# Patient Record
Sex: MALE | Race: WHITE | URBAN - METROPOLITAN AREA
[De-identification: names, ages, dates, MRNs, and addresses within clinical notes are randomized per-mention and may not be internally consistent; named-entity substitution may affect disease eponyms.]

---

## 2017-01-06 ENCOUNTER — PRE VISIT (OUTPATIENT)
Dept: UROLOGY | Facility: CLINIC | Age: 42
End: 2017-01-06

## 2017-01-06 DIAGNOSIS — R35.1 NOCTURIA: Primary | ICD-10-CM

## 2017-01-06 NOTE — TELEPHONE ENCOUNTER
Christopher Martin is scheduled for Urodynamic testing.   Pre visit phone call made on 1/6/2017.    Voicemail reminder left for patient: No  Appointment date and time confirmed with patient: Yes.  Patient has signs/symptoms of UTI in the last 72 hours: No  Patient instructed to complete UAUC prior to test to rule out infection:No   Patient has neurogenic bladder and will continue with normal bowel program as well as complete fleets enema the night prior to testing: No  If patient takes anticholinergic medication: instructed to discontinue 3-5 days prior to test if med is not helpful (OR) If it is effective, patient instructed to continue with medications as prescribed. No, patient does not take anticholinergic.    Patient reminded to:  -Bring voiding diary to appointment: Not given  -Arrive with a comfortably full bladder: No  -Eat and and drink normally before the test: Yes.  -Not use lotion or powder on skin below the waist before testing: Yes.  -Encouraged to arrive on time for appointment due to complexity of test being performed: Yes.

## 2017-01-18 ENCOUNTER — OFFICE VISIT (OUTPATIENT)
Dept: UROLOGY | Facility: CLINIC | Age: 42
End: 2017-01-18

## 2017-01-18 DIAGNOSIS — R33.9 INCOMPLETE BLADDER EMPTYING: ICD-10-CM

## 2017-01-18 DIAGNOSIS — N31.9 NEUROGENIC BLADDER: Primary | ICD-10-CM

## 2017-01-18 DIAGNOSIS — N39.46 MIXED INCONTINENCE: Primary | ICD-10-CM

## 2017-01-18 LAB
APPEARANCE UR: CLEAR
BILIRUB UR QL: NORMAL
COLOR UR: YELLOW
GLUCOSE URINE: NORMAL MG/DL
HGB UR QL: NORMAL
KETONES UR QL: NORMAL MG/DL
LEUKOCYTE ESTERASE URINE: NORMAL
NITRITE UR QL STRIP: NORMAL
PH UR STRIP: 5 PH (ref 5–7)
PROTEIN ALBUMIN URINE: NORMAL MG/DL
SOURCE: NORMAL
SP GR UR STRIP: 1.01 (ref 1–1.03)
UROBILINOGEN UR QL STRIP: 0.2 EU/DL (ref 0.2–1)

## 2017-01-18 NOTE — PROGRESS NOTES
INDICATIONS FOR PROCEDURE:  Mr. Christopher Martin is a pleasant 41 year old male with nocturia enuresis,  decreased bladder emptying (CIC 8x/day), and mixed incontinence.  Patient continues to have residual lower extremity weakness/decreased sensation, and some difficulty with fecal incontinece/and bowel elimination. Patient denies dysuria (attempts to void when bladder is full), hematuria, recent UTI or kidney stones.    Pertinent medical history:  Lumbar burst fractures from  fall resulting in cauda equina syndrome-T11-L3 posterior fusion (2015)     PROCEDURE:    1.Complex filling cystourethrogram with measurement of bladder and rectal pressures.  2.Complex voiding cystourethrogram with measurement of bladder and rectal pressures.  3.Electromyography during urodynamics.  4.Sterile urethral catheterization for measurement of postvoid residual urine volume.    VOIDING DIARY:  Did not complete-patient does CIC 8x/day-uses 6 depends in 24 hours.     DESCRIPTION OF PROCEDURE:  Risks, benefits, and alternatives were discussed with the patient and they wished to proceed. Urodynamics is planned to better assess the primary etiology for Mr. Martin's urologic dysfunction.  The patient does not take anticholinergic medication.  After informed consent was obtained, the patient was taken to the procedure room where uroflowmetry was performed. Findings below. Next a triple-lumen urodynamics catheter was inserted in the bladder. A rectal manometry catheter was placed in the rectum. EMG pads were placed on either side of the anal verge. The bladder was filled with sterile saline at 25 cc/minute and serial pressures were recorded.     Pre-test urine dipstick was  negative for nitrites or leukocytes.  UROFLOW: patient does not naturally void-CIC per sensation 8x/day  Post void residual per catheter: 225 ml     DURING THE FILLING PHASE:  At the following volumes the patient experienced:  First sensation: 87 ml  First Desire: 133  ml  Strong Desire/capacity: 331 ml-followed by uninhibited detrusor contraction with large incontinence    Uninhibited detrusor contractions: single event with multiple contractions at strong desire (331 ml)   Compliance: Good Pdet = 17 cm H2O at capacity of 331 mL,  associated incontinence   Uninhibited detruser contraction: yes.  At a capacity of 331 ml patient does not feel urgency.    Stress incontinence: yes at leak at abd pressure of 109 cmH2O and volume of 200 ml(due to previous incontinence with strong desire)- no ISD.  EMG: concordant    DURING THE VOIDING PHASE: unable to void with catheters in place  Peak Detrusor Contraction with attempt to void: 11 cmH2O  Voided volume: 0 ml (with catheters)  325 ml without catheters with Crede voiding   Post void Residual per ultrasound: 20 ml  DSD: not present with attempt to void    FLUOROSCOPIC IMAGING OF THE BLADDER DURING UDS demonstrated a slightly irregular walled bladder- Grade 0 (absent) trabeculation  no vesicoureteral reflux was observed.  The bladder neck was closed during filling and  During attempt to void-open with incontinence.  200 ml of contrast instilled via urethra   After voiding to completion, all catheters were removed and the patient was brought back into the consultation room to discuss his study results.      A single cipro antibiotic was provided for UTI prophylaxis per protocol, following completion of study.     A/P:  Mr. Christopher Martin is a very pleasant 41 year old male who demonstrated:  Good compliance    Small/normal bladder capacity   Evidence of OAB/uninhibited detrusor contractions at strong desire  Large incontinence caused by uninhibited contraction at strong desire (331 ml) small incontinence with stress test (valsalva) at 200 ml.    No detrusor sphincter dyssynergia  No  signs of outlet obstruction-able to Crede void to PVR of 20 ml.        Follow up with Dr. Sneed 1/30/2017 to discuss results of this study and treatment option  for incomplete bladder emptying and incontinence which may include other catheterization options, medication management, PTNS or surgical intervention.  Until then patient will continue to CIC per sensation-suggested more often at night time (set alarm) to assist with nocturnal enuresis.     Thank you for allowing me to participate in the care of  Mr. Christopher Martin.  Aide Domingo PA-C, PT  January 18, 2017

## 2017-01-18 NOTE — Clinical Note
1/18/2017       RE: Christopher Martin  [address]  [facility] [address]     Dear Colleague,    Thank you for referring your patient, Christopher Martin, to the [facility] UROLOGY AND INST FOR PROSTATE AND UROLOGIC CANCERS at [facility]. Please see a copy of my visit note below.    INDICATIONS FOR PROCEDURE:  Mr. Christopher Martin is a pleasant 41 year old male with nocturia enuresis,  decreased bladder emptying (CIC 8x/day), and mixed incontinence.  Patient continues to have residual lower extremity weakness/decreased sensation, and some difficulty with fecal incontinece/and bowel elimination. Patient denies dysuria (attempts to void when bladder is full), hematuria, recent UTI or kidney stones.    Pertinent medical history:  Lumbar burst fractures from  fall resulting in cauda equina syndrome-T11-L3 posterior fusion (2015)     PROCEDURE:    1.Complex filling cystourethrogram with measurement of bladder and rectal pressures.  2.Complex voiding cystourethrogram with measurement of bladder and rectal pressures.  3.Electromyography during urodynamics.  4.Sterile urethral catheterization for measurement of postvoid residual urine volume.    VOIDING DIARY:  Did not complete-patient does CIC 8x/day-uses 6 depends in 24 hours.     DESCRIPTION OF PROCEDURE:  Risks, benefits, and alternatives were discussed with the patient and they wished to proceed. Urodynamics is planned to better assess the primary etiology for Mr. Martin's urologic dysfunction.  The patient does not take anticholinergic medication.  After informed consent was obtained, the patient was taken to the procedure room where uroflowmetry was performed. Findings below. Next a triple-lumen urodynamics catheter was inserted in the bladder. A rectal manometry catheter was placed in the rectum. EMG pads were placed on either side of the anal verge. The bladder was filled with sterile saline at 25 cc/minute and serial pressures were  recorded.     Pre-test urine dipstick was  negative for nitrites or leukocytes.  UROFLOW: patient does not naturally void-CIC per sensation 8x/day  Post void residual per catheter: 225 ml     DURING THE FILLING PHASE:  At the following volumes the patient experienced:  First sensation: 87 ml  First Desire: 133 ml  Strong Desire/capacity: 331 ml-followed by uninhibited detrusor contraction with large incontinence    Uninhibited detrusor contractions: single event with multiple contractions at strong desire (331 ml)   Compliance: Good Pdet = 17 cm H2O at capacity of 331 mL,  associated incontinence   Uninhibited detruser contraction: yes.  At a capacity of 331 ml patient does not feel urgency.    Stress incontinence: yes at leak at abd pressure of 109 cmH2O and volume of 200 ml(due to previous incontinence with strong desire)- no ISD.  EMG: concordant    DURING THE VOIDING PHASE: unable to void with catheters in place  Peak Detrusor Contraction with attempt to void: 11 cmH2O  Voided volume: 0 ml (with catheters)  325 ml without catheters with Crede voiding   Post void Residual per ultrasound: 20 ml  DSD: not present with attempt to void    FLUOROSCOPIC IMAGING OF THE BLADDER DURING UDS demonstrated a slightly irregular walled bladder- Grade 0 (absent) trabeculation  no vesicoureteral reflux was observed.  The bladder neck was closed during filling and  During attempt to void-open with incontinence.  200 ml of contrast instilled via urethra   After voiding to completion, all catheters were removed and the patient was brought back into the consultation room to discuss his study results.      A single cipro antibiotic was provided for UTI prophylaxis per protocol, following completion of study.     A/P:  Mr. Christopher Martin is a very pleasant 41 year old male who demonstrated:  Good compliance    Small/normal bladder capacity   Evidence of OAB/uninhibited detrusor contractions at strong desire  Large incontinence caused  by uninhibited contraction at strong desire (331 ml) small incontinence with stress test (valsalva) at 200 ml.    No detrusor sphincter dyssynergia  No  signs of outlet obstruction-able to Crede void to PVR of 20 ml.        Follow up with Dr. Sneed 1/30/2017 to discuss results of this study and treatment option for incomplete bladder emptying and incontinence which may include other catheterization options, medication management, PTNS or surgical intervention.  Until then patient will continue to CIC per sensation-suggested more often at night time (set alarm) to assist with nocturnal enuresis.     Thank you for allowing me to participate in the care of  Mr. Christopher Martin.  Aide Domingo PA-C, PT  January 18, 2017

## 2017-01-23 DIAGNOSIS — N31.9 NEUROGENIC BLADDER: Primary | ICD-10-CM

## 2017-01-24 ENCOUNTER — PRE VISIT (OUTPATIENT)
Dept: UROLOGY | Facility: CLINIC | Age: 42
End: 2017-01-24

## 2017-01-24 NOTE — TELEPHONE ENCOUNTER
Patient coming in for UDS results which was completed on 1/18/17. Patient is also NGB patient and has renal US and lab visit prior to seeing Dr Sneed. Records in [facility]. No need to call patient

## 2017-01-30 ENCOUNTER — OFFICE VISIT (OUTPATIENT)
Dept: UROLOGY | Facility: CLINIC | Age: 42
End: 2017-01-30

## 2017-01-30 VITALS
BODY MASS INDEX: 29.16 KG/M2 | DIASTOLIC BLOOD PRESSURE: 74 MMHG | SYSTOLIC BLOOD PRESSURE: 122 MMHG | HEIGHT: 73 IN | HEART RATE: 74 BPM | WEIGHT: 220 LBS

## 2017-01-30 DIAGNOSIS — E29.1 TESTICULAR HYPOFUNCTION: ICD-10-CM

## 2017-01-30 DIAGNOSIS — N31.9 NEUROGENIC BLADDER: ICD-10-CM

## 2017-01-30 DIAGNOSIS — R68.82 DECREASED LIBIDO: ICD-10-CM

## 2017-01-30 DIAGNOSIS — N31.9 NEUROGENIC BLADDER: Primary | ICD-10-CM

## 2017-01-30 LAB
ANION GAP SERPL CALCULATED.3IONS-SCNC: 8 MMOL/L (ref 3–14)
BUN SERPL-MCNC: 13 MG/DL (ref 7–30)
CALCIUM SERPL-MCNC: 8.3 MG/DL (ref 8.5–10.1)
CHLORIDE SERPL-SCNC: 107 MMOL/L (ref 94–109)
CO2 SERPL-SCNC: 28 MMOL/L (ref 20–32)
CREAT SERPL-MCNC: 0.81 MG/DL (ref 0.66–1.25)
FERRITIN SERPL-MCNC: 111 NG/ML (ref 26–388)
FSH SERPL-ACNC: 3.7 IU/L (ref 0.7–10.8)
GFR SERPL CREATININE-BSD FRML MDRD: ABNORMAL ML/MIN/1.7M2
GLUCOSE SERPL-MCNC: 86 MG/DL (ref 70–99)
LH SERPL-ACNC: 4.6 IU/L (ref 1.5–9.3)
POTASSIUM SERPL-SCNC: 4 MMOL/L (ref 3.4–5.3)
PROLACTIN SERPL-MCNC: 25 UG/L (ref 2–18)
SODIUM SERPL-SCNC: 142 MMOL/L (ref 133–144)

## 2017-01-30 RX ORDER — POLYETHYLENE GLYCOL 3350 17 G/17G
5 POWDER, FOR SOLUTION ORAL DAILY
Qty: 510 G | Refills: 1 | Status: SHIPPED | OUTPATIENT
Start: 2017-01-30

## 2017-01-30 RX ORDER — SOLIFENACIN SUCCINATE 5 MG/1
5 TABLET, FILM COATED ORAL DAILY
Qty: 90 TABLET | Refills: 3 | Status: SHIPPED | OUTPATIENT
Start: 2017-01-30 | End: 2018-02-14

## 2017-01-30 ASSESSMENT — PAIN SCALES - GENERAL: PAINLEVEL: NO PAIN (0)

## 2017-01-30 NOTE — MR AVS SNAPSHOT
After Visit Summary   1/30/2017    Mr. Christopher Martin    MRN: [id number]           Patient Information     Date Of Birth          1975        Visit Information        Provider Department      1/30/2017 8:30 AM Gagan Sneed DO [facility] Urology and [facility] for Prostate and Urologic Cancers        Today's Diagnoses     Neurogenic bladder    -  1       Care Instructions    Follow up with Dr Sneed on 5/15/17 at 8 am    It was a pleasure meeting with you today.  Thank you for allowing me and my team the privilege of caring for you today.  YOU are the reason we are here, and I truly hope we provided you with the excellent service you deserve.  Please let us know if there is anything else we can do for you so that we can be sure you are leaving completely satisfied with your care experience.                Follow-ups after your visit        Your next 10 appointments already scheduled     May 15, 2017  8:00 AM   (Arrive by 7:45 AM)   Return Visit with Gagan Sneed DO   [facility] Urology and [facility] for [facility]state and Urologic Cancers ([facility])    [address] [place] [id number] [phone]              Who to contact     Please call your clinic at [phone] to:    Ask questions about your health    Make or cancel appointments    Discuss your medicines    Learn about your test results    Speak to your doctor   If you have compliments or concerns about an experience at your clinic, or if you wish to file a complaint, please contact [facility] Patient Relations at [phone] or email us at [email or web address]         Additional Information About Your Visit        MyChart Information     LensAR is an electronic gateway that provides easy, online access to your medical records. With LensAR, you can request a clinic appointment, read your test results, renew a prescription or communicate with your care  "team.     To sign up for [employer] visit the website at [email or web address]   You will be asked to enter the access code listed below, as well as some personal information. Please follow the directions to create your username and password.     Your access code is: [email or web address]  Expires: 2017  5:31 AM     Your access code will  in 90 days. If you need help or a new code, please contact your [facility] or call [phone] for assistance.        Care EveryWhere ID     This is your Care EveryWhere ID. This could be used by other organizations to access your [place] medical records  [id number]        Your Vitals Were     Pulse Height BMI (Body Mass Index)             74 1.854 m (6' 1\") 29.03 kg/m2          Blood Pressure from Last 3 Encounters:   17 122/74   16 139/83   12/21/15 128/84    Weight from Last 3 Encounters:   17 99.791 kg (220 lb)   16 99.791 kg (220 lb)   16 107.548 kg (237 lb 1.6 oz)              Today, you had the following     No orders found for display         Today's Medication Changes          These changes are accurate as of: 17  8:48 AM.  If you have any questions, ask your nurse or doctor.               Start taking these medicines.        Dose/Directions    polyethylene glycol powder   Commonly known as:  MIRALAX   Used for:  Neurogenic bladder   Started by:  Gagan Sneed DO        Dose:  5 g   Take 5 g by mouth daily   Quantity:  510 g   Refills:  1       solifenacin 5 MG tablet   Commonly known as:  VESICARE   Used for:  Neurogenic bladder   Started by:  Gagan Sneed DO        Dose:  5 mg   Take 1 tablet (5 mg) by mouth daily   Quantity:  90 tablet   Refills:  3            Where to get your medicines      These medications were sent to [facility]/pharmacy #[id number] [place], [place] - [address] [address]     Phone:  [phone]    - polyethylene glycol " powder  - solifenacin 5 MG tablet             Primary Care Provider Office Phone # Fax #    Lizzie Immanuel Wang [phone]       [facility] [address] [facility] [address]        Thank you!     Thank you for choosing [facility] UROLOGY AND [facility] FOR PROSTATE AND UROLOGIC CANCERS  for your care. Our goal is always to provide you with excellent care. Hearing back from our patients is one way we can continue to improve our services. Please take a few minutes to complete the written survey that you may receive in the mail after your visit with us. Thank you!             Your Updated Medication List - Protect others around you: Learn how to safely use, store and throw away your medicines at www.disposemymeds.org.          This list is accurate as of: 1/30/17  8:48 AM.  Always use your most recent med list.                   Brand Name Dispense Instructions for use    clomiPHENE 50 MG tablet    CLOMID    15 tablet    Take one half tablet (25 mg) Monday, Wednesday and Friday       clonazePAM 1 MG tablet    klonoPIN    60 tablet    Take 1 tablet (1 mg) by mouth 2 times daily as needed for anxiety       cyclobenzaprine 5 MG tablet    FLEXERIL     Take 5 mg by mouth       disulfiram 250 MG tablet    ANTABUSE    90 tablet    Take 1 tablet (250 mg) by mouth daily       gabapentin 400 MG capsule    NEURONTIN    90 capsule    Take 1 capsule (400 mg) by mouth 3 times daily       oxybutynin 5 MG tablet    DITROPAN    30 tablet    Take 1-2 tablets (5-10 mg) by mouth nightly as needed for bladder spasms       polyethylene glycol powder    MIRALAX    510 g    Take 5 g by mouth daily       sildenafil 100 MG cap/tab    VIAGRA    6 tablet    Take 0.5-1 tablets ( mg) by mouth daily as needed for erectile dysfunction       solifenacin 5 MG tablet    VESICARE    90 tablet    Take 1 tablet (5 mg) by mouth daily       venlafaxine 150 MG 24 hr capsule    EFFEXOR-XR    90 capsule    Take 1 capsule  (150 mg) by mouth daily

## 2017-01-30 NOTE — PROGRESS NOTES
New Consult for Neurogenic Bladder    Name: Christopher Martin    MRN: [id number]   YOB: 1975  Accompanied at today's visit by:spouse                 Chief Complaint:   Neurogenic Bladder          History of Present Illness:   HISTORY: Christopher Martin is a 41 year old male with a history of neurogenic bladder secondary to SCI / L1 burst fracture after a fall in 2015. Patient is not wheelchair bound.     Previous Bladder Surgeries:  Previous Bladder Augmentation: none  Catheterizable stoma:none  Anti-incontinence procedures: none  Botox injections: None    Pertinent Medications:  Current Anticholinergics: none (waiting for Rx to go through)  Current Prophylactic antibiotics: None  Intravesical gentamycin:  None  Intravesical oxybutinin: None    Catheterization History:  The patient catheterizes per native urethra with a 14F straight catheter q4 hours. Catheterization is performed by  self. The patient uses a new catheter each time. He does not irrigate the bladder.     Incontinence History:  He does not leak between voids/caths. He does experience urgency of urination. He does experience stress urinary incontinence with the following activities: upon standing with a full bladder. At night, he has significant incontinence requiring a brief and a pad that gets quite wet during the night. He wakes up twice a night (but sometimes less because he's a heavy sleeper)    Urinary Tract Infection History:  Treated with antibiotics for positive culture and non-specific symptoms: 0  times in the last year    Bowel Movement History:  The patient has a bowel movement q1 days. Bowel regimen involves daily digital stimulation. He does have fecal urgency, but is able to make it to the toilet - no leakage episodes.    Sexual History  The patient is sexually active. He does have spontaneous erections. He does occasionally use PDE5 inhibitors, which does result in adequate erections. With Viagra, his erections are better, but  not as firm as he would like.    Is able to walk, but has poor plantar flexion and gluteal strength. He does have ability to clench his anal sphincter - but it is weak. He does not think he can hold in flatus.           Past Medical History:     Past Medical History   Diagnosis Date     Stable burst fracture of first lumbar vertebra (H)       9- at work  fell off a roof      Fracture of spinous process of thoracic vertebra, with delayed healing, subsequent encounter      AND LUMBAR      Spinal cord injury, lumbar, without spinal bone injury (H)      Neurogenic bladder      Weakness      le BILATERAL     Anxiety      Panic attack      Hypertension             Past Surgical History:     Past Surgical History   Procedure Laterality Date     As spinal fusion,ant,ea adnl level        t-11- l3      Knee scope              Social History:     Social History   Substance Use Topics     Smoking status: Former Smoker     Smokeless tobacco: Current User     Alcohol Use: No            Family History:     Family History   Problem Relation Age of Onset     Family History Negative Mother             Allergies:     Allergies   Allergen Reactions     Penicillin G Anaphylaxis     Penicillins Anaphylaxis            Medications:     Current Outpatient Prescriptions   Medication Sig     oxybutynin (DITROPAN) 5 MG tablet Take 1-2 tablets (5-10 mg) by mouth nightly as needed for bladder spasms     clomiPHENE (CLOMID) 50 MG tablet Take one half tablet (25 mg) Monday, Wednesday and Friday     venlafaxine (EFFEXOR-XR) 150 MG 24 hr capsule Take 1 capsule (150 mg) by mouth daily     clonazePAM (KLONOPIN) 1 MG tablet Take 1 tablet (1 mg) by mouth 2 times daily as needed for anxiety     sildenafil (VIAGRA) 100 MG tablet Take 0.5-1 tablets ( mg) by mouth daily as needed for erectile dysfunction     cyclobenzaprine (FLEXERIL) 5 MG tablet Take 5 mg by mouth     gabapentin (NEURONTIN) 400 MG capsule Take 1 capsule (400  "mg) by mouth 3 times daily     disulfiram (ANTABUSE) 250 MG tablet Take 1 tablet (250 mg) by mouth daily     No current facility-administered medications for this visit.             Review of Systems:    ROS: 10 point ROS neg other than the symptoms noted above in the HPI and PMH.          Physical Exam:   B/P: 122/74, T: Data Unavailable, P: 74, R: Data Unavailable  Estimated body mass index is 29.03 kg/(m^2) as calculated from the following:    Height as of this encounter: 1.854 m (6' 1\").    Weight as of this encounter: 99.791 kg (220 lb).  General: age-appropriate appearing male in NAD sitting in an exam chair.    HEENT: Head AT/NC, EOMI, CN Grossly intact.  Resp: no respiratory distress  CV: heart rate regular  Lymph: No cervical, supraclavicular or axillary lymphadenopathy  Back: bony spine is non-tender, flanks are non-tender.  Abdomen: Degree of obesity is none. Abdomen is soft and nontender. No organomegaly.  Rectal exam: deferred  LE: Edema is none           Data:      Imagin17 Renal US:  No hydro stones masses    Labs:  CREATININE   Date Value Ref Range Status   2017 0.81 0.66 - 1.25 mg/dL Final     Outside records:  I spent 20 minutes reviewing outside records.         Assessment and Plan:   41 year old male with neurogenic bladder secondary to L1 burst fracture requiring spinal fusion.    -Will start vesicare to help with detrusor overactivity that manifests at volumes ~300 cc. He has good compliance at these volumes and his cath schedule seems appropriate.  -Recommend daily or EOD 5 gm Miralax to keep stools soft and facilitate more efficient evacuation when he does have his BM. Advised to wear a brief for the first couple weeks to ensure he doesn't have leakage. Should this happen, we can lower the frequency of intake.  -Doing well with Viagra, will continue PRN use for now.   -RTC 2 months     I spent 55 minutes with the patient discussing the above mentioned findings and reviewing the " assessment and plan, greater than 50% of which was spent on counseling and coordination of care. All questions were answered to the patients satisfaction.    Gagan Sneed DO  Fellow/Instructor  Department of Urology      CC:  Dr. Lao (PM&R) at [facility]

## 2017-01-30 NOTE — Clinical Note
My note from today needs to be sent to Dr. Lao (PM&R) at [facility]. Thanks!

## 2017-01-30 NOTE — Clinical Note
1/30/2017       RE: Christopher Martin  [address]  [facility] [address]     Dear Colleague,    Thank you for referring your patient, Christopher Martin, to the [facility] UROLOGY AND INST FOR PROSTATE AND UROLOGIC CANCERS at [facility]. Please see a copy of my visit note below.    New Consult for Neurogenic Bladder    Name: Christopher Martin    MRN: [id number]   YOB: 1975  Accompanied at today's visit by:spouse                 Chief Complaint:   Neurogenic Bladder          History of Present Illness:   HISTORY: Christopher Martin is a 41 year old male with a history of neurogenic bladder secondary to SCI / L1 burst fracture after a fall in 2015. Patient is not wheelchair bound.     Previous Bladder Surgeries:  Previous Bladder Augmentation: none  Catheterizable stoma:none  Anti-incontinence procedures: none  Botox injections: None    Pertinent Medications:  Current Anticholinergics: none (waiting for Rx to go through)  Current Prophylactic antibiotics: None  Intravesical gentamycin:  None  Intravesical oxybutinin: None    Catheterization History:  The patient catheterizes per native urethra with a 14F straight catheter q4 hours. Catheterization is performed by  self. The patient uses a new catheter each time. He does not irrigate the bladder.     Incontinence History:  He does not leak between voids/caths. He does experience urgency of urination. He does experience stress urinary incontinence with the following activities: upon standing with a full bladder. At night, he has significant incontinence requiring a brief and a pad that gets quite wet during the night. He wakes up twice a night (but sometimes less because he's a heavy sleeper)    Urinary Tract Infection History:  Treated with antibiotics for positive culture and non-specific symptoms: 0  times in the last year    Bowel Movement History:  The patient has a bowel movement q1 days. Bowel regimen involves daily  digital stimulation. He does have fecal urgency, but is able to make it to the toilet - no leakage episodes.    Sexual History  The patient is sexually active. He does have spontaneous erections. He does occasionally use PDE5 inhibitors, which does result in adequate erections. With Viagra, his erections are better, but not as firm as he would like.    Is able to walk, but has poor plantar flexion and gluteal strength. He does have ability to clench his anal sphincter - but it is weak. He does not think he can hold in flatus.           Past Medical History:     Past Medical History   Diagnosis Date     Stable burst fracture of first lumbar vertebra (H)       9- at work  fell off a roof      Fracture of spinous process of thoracic vertebra, with delayed healing, subsequent encounter      AND LUMBAR      Spinal cord injury, lumbar, without spinal bone injury (H)      Neurogenic bladder      Weakness      le BILATERAL     Anxiety      Panic attack      Hypertension             Past Surgical History:     Past Surgical History   Procedure Laterality Date     As spinal fusion,ant,ea adnl level        t-11- l3      Knee scope              Social History:     Social History   Substance Use Topics     Smoking status: Former Smoker     Smokeless tobacco: Current User     Alcohol Use: No            Family History:     Family History   Problem Relation Age of Onset     Family History Negative Mother             Allergies:     Allergies   Allergen Reactions     Penicillin G Anaphylaxis     Penicillins Anaphylaxis            Medications:     Current Outpatient Prescriptions   Medication Sig     oxybutynin (DITROPAN) 5 MG tablet Take 1-2 tablets (5-10 mg) by mouth nightly as needed for bladder spasms     clomiPHENE (CLOMID) 50 MG tablet Take one half tablet (25 mg) Monday, Wednesday and Friday     venlafaxine (EFFEXOR-XR) 150 MG 24 hr capsule Take 1 capsule (150 mg) by mouth daily     clonazePAM (KLONOPIN)  "1 MG tablet Take 1 tablet (1 mg) by mouth 2 times daily as needed for anxiety     sildenafil (VIAGRA) 100 MG tablet Take 0.5-1 tablets ( mg) by mouth daily as needed for erectile dysfunction     cyclobenzaprine (FLEXERIL) 5 MG tablet Take 5 mg by mouth     gabapentin (NEURONTIN) 400 MG capsule Take 1 capsule (400 mg) by mouth 3 times daily     disulfiram (ANTABUSE) 250 MG tablet Take 1 tablet (250 mg) by mouth daily     No current facility-administered medications for this visit.             Review of Systems:    ROS: 10 point ROS neg other than the symptoms noted above in the HPI and PMH.          Physical Exam:   B/P: 122/74, T: Data Unavailable, P: 74, R: Data Unavailable  Estimated body mass index is 29.03 kg/(m^2) as calculated from the following:    Height as of this encounter: 1.854 m (6' 1\").    Weight as of this encounter: 99.791 kg (220 lb).  General: age-appropriate appearing male in NAD sitting in an exam chair.    HEENT: Head AT/NC, EOMI, CN Grossly intact.  Resp: no respiratory distress  CV: heart rate regular  Lymph: No cervical, supraclavicular or axillary lymphadenopathy  Back: bony spine is non-tender, flanks are non-tender.  Abdomen: Degree of obesity is none. Abdomen is soft and nontender. No organomegaly.  Rectal exam: deferred  LE: Edema is none           Data:      Imagin17 Renal US:  No hydro stones masses    Labs:  CREATININE   Date Value Ref Range Status   2017 0.81 0.66 - 1.25 mg/dL Final     Outside records:  I spent 20 minutes reviewing outside records.         Assessment and Plan:   41 year old male with neurogenic bladder secondary to L1 burst fracture requiring spinal fusion.    -Will start vesicare to help with detrusor overactivity that manifests at volumes ~300 cc. He has good compliance at these volumes and his cath schedule seems appropriate.  -Recommend daily or EOD 5 gm Miralax to keep stools soft and facilitate more efficient evacuation when he does have " his BM. Advised to wear a brief for the first couple weeks to ensure he doesn't have leakage. Should this happen, we can lower the frequency of intake.  -Doing well with Viagra, will continue PRN use for now.   -RTC 2 months     I spent 55 minutes with the patient discussing the above mentioned findings and reviewing the assessment and plan, greater than 50% of which was spent on counseling and coordination of care. All questions were answered to the patients satisfaction.    Gagan Sneed DO  Fellow/Instructor  Department of Urology      CC:  Dr. Lao (PM&R) at [facility]

## 2017-01-30 NOTE — PATIENT INSTRUCTIONS
Follow up with Dr Sneed on 5/15/17 at 8 am    It was a pleasure meeting with you today.  Thank you for allowing me and my team the privilege of caring for you today.  YOU are the reason we are here, and I truly hope we provided you with the excellent service you deserve.  Please let us know if there is anything else we can do for you so that we can be sure you are leaving completely satisfied with your care experience.

## 2017-01-31 ENCOUNTER — TELEPHONE (OUTPATIENT)
Dept: UROLOGY | Facility: CLINIC | Age: 42
End: 2017-01-31

## 2017-01-31 LAB
SHBG SERPL-SCNC: 96 NMOL/L (ref 11–80)
TESTOST FREE SERPL-MCNC: 5.37 NG/DL (ref 4.7–24.4)
TESTOST SERPL-MCNC: 562 NG/DL (ref 240–950)

## 2017-02-07 NOTE — PROGRESS NOTES
Quick Note:    Please notify pt of these results.  Testosterone looks fine, overall.   Lutropin and ferritin are proteins/hormones that help regulate testosterone level, these are all normal. But prolactin was slightly high.    I recommend seeing Dr. Golden for a 2nd opinion on the testosterone level and slightly elevated prolactin.    - thanks.  CLAUDIA      ______

## 2017-03-10 NOTE — TELEPHONE ENCOUNTER
[facility] Prior Authorization Team   Phone: [phone]  Fax: [phone]      Prior Authorization Not Needed per Pharmacy - work comp claim   Medication: vesicare 5 mg  Pharmacy Filling the Rx: [id number] - [place], [place] - [address]  Pharmacy Notified:  Yes

## 2017-05-08 ENCOUNTER — PRE VISIT (OUTPATIENT)
Dept: UROLOGY | Facility: CLINIC | Age: 42
End: 2017-05-08

## 2017-05-08 NOTE — TELEPHONE ENCOUNTER
Spoke with patient about visit. Patient is currently taking vesicare and self caths 7 times a day. Patient last saw Dr Sneed on 1/30/17. Patient coming in for follow up with no labs or imaging needed

## 2017-05-16 ENCOUNTER — OFFICE VISIT (OUTPATIENT)
Dept: UROLOGY | Facility: CLINIC | Age: 42
End: 2017-05-16

## 2017-05-16 VITALS
WEIGHT: 217 LBS | BODY MASS INDEX: 28.76 KG/M2 | HEIGHT: 73 IN | DIASTOLIC BLOOD PRESSURE: 74 MMHG | HEART RATE: 76 BPM | SYSTOLIC BLOOD PRESSURE: 112 MMHG

## 2017-05-16 DIAGNOSIS — N31.9 NEUROGENIC BLADDER: Primary | ICD-10-CM

## 2017-05-16 ASSESSMENT — PAIN SCALES - GENERAL: PAINLEVEL: NO PAIN (0)

## 2017-05-16 NOTE — PROGRESS NOTES
Urology Consultation/Follow-up    Name: Christopher Martin    MRN: [id number]   YOB: 1975  Accompanied at today's visit by:spouse                 Chief Complaint:   Neurogenic Bladder          History of Present Illness:     HPI 5/16/17:  He has been on Vesicare for NDO, which has been helping a lot. He catheterizes every 4 hours or so and thinks his volumes are 4-600 cc. When he's really full, he still leaks a bit, however, it's certainly better than before. He uses 3 pads / 24 hours, he does wear a pull up and sleeps very deeply and voids into it once/night.    His wife just had a baby boy 2 months ago. Viagra was working acceptably well, however, hasn't needed it recently. When we see each other next, we'll re-evaluate his erection when he becomes sexually active again.    HP 1/30/17: Christopher Martin is a 41 year old male with a history of neurogenic bladder secondary to SCI / L1 burst fracture after a fall in 2015. Patient is not wheelchair bound.     Previous Bladder Surgeries:  Previous Bladder Augmentation: none  Catheterizable stoma:none  Anti-incontinence procedures: none  Botox injections: None    Pertinent Medications:  Current Anticholinergics: none (waiting for Rx to go through)  Current Prophylactic antibiotics: None  Intravesical gentamycin:  None  Intravesical oxybutinin: None    Catheterization History:  The patient catheterizes per native urethra with a 14F straight catheter q4 hours. Catheterization is performed by  self. The patient uses a new catheter each time. He does not irrigate the bladder.     Incontinence History:  He does not leak between voids/caths. He does experience urgency of urination. He does experience stress urinary incontinence with the following activities: upon standing with a full bladder. At night, he has significant incontinence requiring a brief and a pad that gets quite wet during the night. He wakes up twice a night (but sometimes less because he's a heavy  hanna).    Urinary Tract Infection History:  Treated with antibiotics for positive culture and non-specific symptoms: 0  times in the last year    Bowel Movement History:  The patient has a bowel movement q1 days. Bowel regimen involves daily digital stimulation. He does have fecal urgency, but is able to make it to the toilet - no leakage episodes.    Sexual History  The patient is sexually active. He does have spontaneous erections. He does occasionally use PDE5 inhibitors, which does result in adequate erections. With Viagra, his erections are better, but not as firm as he would like.    Is able to walk, but has poor plantar flexion and gluteal strength. He does have ability to clench his anal sphincter - but it is weak. He does not think he can hold in flatus.           Past Medical History:     Past Medical History:   Diagnosis Date     Anxiety      Fracture of spinous process of thoracic vertebra, with delayed healing, subsequent encounter     AND LUMBAR      Hypertension      Neurogenic bladder      Panic attack      Spinal cord injury, lumbar, without spinal bone injury (H)      Stable burst fracture of first lumbar vertebra (H)      9- at work  fell off a roof      Weakness     le BILATERAL            Past Surgical History:     Past Surgical History:   Procedure Laterality Date     AS SPINAL FUSION,ANT,EA ADNL LEVEL       t-11- l3      Knee scope              Social History:     Social History   Substance Use Topics     Smoking status: Former Smoker     Smokeless tobacco: Current User     Alcohol use No            Family History:     Family History   Problem Relation Age of Onset     Family History Negative Mother             Allergies:     Allergies   Allergen Reactions     Penicillin G Anaphylaxis     Penicillins Anaphylaxis            Medications:     Current Outpatient Prescriptions   Medication Sig     solifenacin (VESICARE) 5 MG tablet Take 1 tablet (5 mg) by mouth daily      "polyethylene glycol (MIRALAX) powder Take 5 g by mouth daily     oxybutynin (DITROPAN) 5 MG tablet Take 1-2 tablets (5-10 mg) by mouth nightly as needed for bladder spasms     clomiPHENE (CLOMID) 50 MG tablet Take one half tablet (25 mg) Monday, Wednesday and Friday     venlafaxine (EFFEXOR-XR) 150 MG 24 hr capsule Take 1 capsule (150 mg) by mouth daily     clonazePAM (KLONOPIN) 1 MG tablet Take 1 tablet (1 mg) by mouth 2 times daily as needed for anxiety     sildenafil (VIAGRA) 100 MG tablet Take 0.5-1 tablets ( mg) by mouth daily as needed for erectile dysfunction     cyclobenzaprine (FLEXERIL) 5 MG tablet Take 5 mg by mouth     gabapentin (NEURONTIN) 400 MG capsule Take 1 capsule (400 mg) by mouth 3 times daily     disulfiram (ANTABUSE) 250 MG tablet Take 1 tablet (250 mg) by mouth daily     No current facility-administered medications for this visit.              Review of Systems:    ROS: 10 point ROS neg other than the symptoms noted above in the HPI and PMH.          Physical Exam:   B/P: 122/74, T: Data Unavailable, P: 74, R: Data Unavailable  Estimated body mass index is 29.03 kg/(m^2) as calculated from the following:    Height as of 17: 1.854 m (6' 1\").    Weight as of 17: 99.8 kg (220 lb).            Data:      Imagin17 Renal US:  No hydro stones masses    Labs:  Creatinine   Date Value Ref Range Status   2017 0.81 0.66 - 1.25 mg/dL Final            Assessment and Plan:   41 year old male with neurogenic bladder secondary to L1 burst fracture requiring spinal fusion.    -We will continue vesicare  -He takes Miralax 3x/week and seems to be in a good balance.  -Will RTC 6 months to evaluate efficacy of Viagra. He does note that he has lost some libido, however, my sense is that he likely has a strong libido, but has lost some confidence in his abilities, both due to weakness from his injury and his compromised erections. I think with the appropraite medication dosages and " adjunct treatments such as ring clamps or vacuum pumps, or even ICI, we can restore his erectile function and improve his confidence.  -After his 6 m visit, he can have annual NGB f/u visits with renal US and BMP with Denice Collado PA-C    F/U with Dr. Turner in 6 months    I spent 25 minutes with the patient discussing the above mentioned findings and reviewing the assessment and plan, greater than 50% of which was spent on counseling and coordination of care. All questions were answered to the patients satisfaction.    Gagan Sneed DO  Fellow/Instructor  Department of Urology

## 2017-05-16 NOTE — MR AVS SNAPSHOT
After Visit Summary   5/16/2017    Mr. Christopher Martin    MRN: [id number]           Patient Information     Date Of Birth          1975        Visit Information        Provider Department      5/16/2017 1:00 PM Gagan Sneed [facility] Urology and [facility] for Prostate and Urologic Cancers        Today's Diagnoses     Neurogenic bladder    -  1      Care Instructions    Please follow up in 6 months with Alden the fellow.    It was a pleasure meeting with you today.  Thank you for allowing me and my team the privilege of caring for you today.  YOU are the reason we are here, and I truly hope we provided you with the excellent service you deserve.  Please let us know if there is anything else we can do for you so that we can be sure you are leaving completely satisfied with your care experience.                Follow-ups after your visit        Follow-up notes from your care team     Return in about 6 months (around 11/16/2017).      Who to contact     Please call your clinic at [phone] to:    Ask questions about your health    Make or cancel appointments    Discuss your medicines    Learn about your test results    Speak to your doctor   If you have compliments or concerns about an experience at your clinic, or if you wish to file a complaint, please contact [facility] Patient Relations at [phone] or email us at [email or web address]         Additional Information About Your Visit        MyChart Information     muzu tvt is an electronic gateway that provides easy, online access to your medical records. With QVPN, you can request a clinic appointment, read your test results, renew a prescription or communicate with your care team.     To sign up for muzu tvt visit the website at [email or web address]   You will be asked to enter the access code listed below, as well as some personal information. Please follow the directions to create your  "username and password.     Your access code is: [email or web address]  Expires: 2017  6:30 AM     Your access code will  in 90 days. If you need help or a new code, please contact your [facility] or call [phone] for assistance.        Care EveryWhere ID     This is your Care EveryWhere ID. This could be used by other organizations to access your [facility] medical records  [id number]        Your Vitals Were     Pulse Height BMI (Body Mass Index)             76 1.854 m (6' 1\") 28.63 kg/m2          Blood Pressure from Last 3 Encounters:   17 112/74   17 122/74   16 139/83    Weight from Last 3 Encounters:   17 98.4 kg (217 lb)   17 99.8 kg (220 lb)   16 99.8 kg (220 lb)              Today, you had the following     No orders found for display       Primary Care Provider Office Phone # Fax #    Lizzie Immanuel Wang [phone]       [facility] [address] [facility] [address]        Thank you!     Thank you for choosing [facility] UROLOGY AND [facility] FOR PROSTATE AND UROLOGIC CANCERS  for your care. Our goal is always to provide you with excellent care. Hearing back from our patients is one way we can continue to improve our services. Please take a few minutes to complete the written survey that you may receive in the mail after your visit with us. Thank you!             Your Updated Medication List - Protect others around you: Learn how to safely use, store and throw away your medicines at www.disposemymeds.org.          This list is accurate as of: 17  1:21 PM.  Always use your most recent med list.                   Brand Name Dispense Instructions for use    clonazePAM 1 MG tablet    klonoPIN    60 tablet    Take 1 tablet (1 mg) by mouth 2 times daily as needed for anxiety       cyclobenzaprine 5 MG tablet    FLEXERIL     Take 5 mg by mouth       LYRICA PO      Take 75 mg by mouth 3 times daily       " polyethylene glycol powder    MIRALAX    510 g    Take 5 g by mouth daily       sildenafil 100 MG cap/tab    VIAGRA    6 tablet    Take 0.5-1 tablets ( mg) by mouth daily as needed for erectile dysfunction       solifenacin 5 MG tablet    VESICARE    90 tablet    Take 1 tablet (5 mg) by mouth daily       venlafaxine 150 MG 24 hr capsule    EFFEXOR-XR    90 capsule    Take 1 capsule (150 mg) by mouth daily

## 2017-05-16 NOTE — PATIENT INSTRUCTIONS
Please follow up in 6 months with Alden the fellow.    It was a pleasure meeting with you today.  Thank you for allowing me and my team the privilege of caring for you today.  YOU are the reason we are here, and I truly hope we provided you with the excellent service you deserve.  Please let us know if there is anything else we can do for you so that we can be sure you are leaving completely satisfied with your care experience.

## 2017-05-16 NOTE — LETTER
5/16/2017       RE: Christopher Martin  [address]  [facility] [address]     Dear Colleague,    Thank you for referring your patient, Christopher Martin, to the [facility] UROLOGY AND INST FOR PROSTATE AND UROLOGIC CANCERS at [facility]. Please see a copy of my visit note below.    Urology Consultation/Follow-up    Name: Christopher Martin    MRN: [id number]   YOB: 1975  Accompanied at today's visit by:spouse                 Chief Complaint:   Neurogenic Bladder          History of Present Illness:     HPI 5/16/17:  He has been on Vesicare for NDO, which has been helping a lot. He catheterizes every 4 hours or so and thinks his volumes are 4-600 cc. When he's really full, he still leaks a bit, however, it's certainly better than before. He uses 3 pads / 24 hours, he does wear a pull up and sleeps very deeply and voids into it once/night.    His wife just had a baby boy 2 months ago. Viagra was working acceptably well, however, hasn't needed it recently. When we see each other next, we'll re-evaluate his erection when he becomes sexually active again.    HP 1/30/17: Chritsopher Martin is a 41 year old male with a history of neurogenic bladder secondary to SCI / L1 burst fracture after a fall in 2015. Patient is not wheelchair bound.     Previous Bladder Surgeries:  Previous Bladder Augmentation: none  Catheterizable stoma:none  Anti-incontinence procedures: none  Botox injections: None    Pertinent Medications:  Current Anticholinergics: none (waiting for Rx to go through)  Current Prophylactic antibiotics: None  Intravesical gentamycin:  None  Intravesical oxybutinin: None    Catheterization History:  The patient catheterizes per native urethra with a 14F straight catheter q4 hours. Catheterization is performed by  self. The patient uses a new catheter each time. He does not irrigate the bladder.     Incontinence History:  He does not leak between voids/caths. He does  experience urgency of urination. He does experience stress urinary incontinence with the following activities: upon standing with a full bladder. At night, he has significant incontinence requiring a brief and a pad that gets quite wet during the night. He wakes up twice a night (but sometimes less because he's a heavy sleeper).    Urinary Tract Infection History:  Treated with antibiotics for positive culture and non-specific symptoms: 0  times in the last year    Bowel Movement History:  The patient has a bowel movement q1 days. Bowel regimen involves daily digital stimulation. He does have fecal urgency, but is able to make it to the toilet - no leakage episodes.    Sexual History  The patient is sexually active. He does have spontaneous erections. He does occasionally use PDE5 inhibitors, which does result in adequate erections. With Viagra, his erections are better, but not as firm as he would like.    Is able to walk, but has poor plantar flexion and gluteal strength. He does have ability to clench his anal sphincter - but it is weak. He does not think he can hold in flatus.           Past Medical History:     Past Medical History:   Diagnosis Date     Anxiety      Fracture of spinous process of thoracic vertebra, with delayed healing, subsequent encounter     AND LUMBAR      Hypertension      Neurogenic bladder      Panic attack      Spinal cord injury, lumbar, without spinal bone injury (H)      Stable burst fracture of first lumbar vertebra (H)      9- at work  fell off a roof      Weakness     le BILATERAL            Past Surgical History:     Past Surgical History:   Procedure Laterality Date     AS SPINAL FUSION,ANT,EA ADNL LEVEL       t-11- l3      Knee scope              Social History:     Social History   Substance Use Topics     Smoking status: Former Smoker     Smokeless tobacco: Current User     Alcohol use No            Family History:     Family History   Problem  "Relation Age of Onset     Family History Negative Mother             Allergies:     Allergies   Allergen Reactions     Penicillin G Anaphylaxis     Penicillins Anaphylaxis            Medications:     Current Outpatient Prescriptions   Medication Sig     solifenacin (VESICARE) 5 MG tablet Take 1 tablet (5 mg) by mouth daily     polyethylene glycol (MIRALAX) powder Take 5 g by mouth daily     oxybutynin (DITROPAN) 5 MG tablet Take 1-2 tablets (5-10 mg) by mouth nightly as needed for bladder spasms     clomiPHENE (CLOMID) 50 MG tablet Take one half tablet (25 mg) Monday, Wednesday and Friday     venlafaxine (EFFEXOR-XR) 150 MG 24 hr capsule Take 1 capsule (150 mg) by mouth daily     clonazePAM (KLONOPIN) 1 MG tablet Take 1 tablet (1 mg) by mouth 2 times daily as needed for anxiety     sildenafil (VIAGRA) 100 MG tablet Take 0.5-1 tablets ( mg) by mouth daily as needed for erectile dysfunction     cyclobenzaprine (FLEXERIL) 5 MG tablet Take 5 mg by mouth     gabapentin (NEURONTIN) 400 MG capsule Take 1 capsule (400 mg) by mouth 3 times daily     disulfiram (ANTABUSE) 250 MG tablet Take 1 tablet (250 mg) by mouth daily     No current facility-administered medications for this visit.              Review of Systems:    ROS: 10 point ROS neg other than the symptoms noted above in the HPI and PMH.          Physical Exam:   B/P: 122/74, T: Data Unavailable, P: 74, R: Data Unavailable  Estimated body mass index is 29.03 kg/(m^2) as calculated from the following:    Height as of 17: 1.854 m (6' 1\").    Weight as of 17: 99.8 kg (220 lb).            Data:      Imagin17 Renal US:  No hydro stones masses    Labs:  Creatinine   Date Value Ref Range Status   2017 0.81 0.66 - 1.25 mg/dL Final            Assessment and Plan:   41 year old male with neurogenic bladder secondary to L1 burst fracture requiring spinal fusion.    -We will continue vesicare  -He takes Miralax 3x/week and seems to be in a good " balance.  -Will RTC 6 months to evaluate efficacy of Viagra. He does note that he has lost some libido, however, my sense is that he likely has a strong libido, but has lost some confidence in his abilities, both due to weakness from his injury and his compromised erections. I think with the appropraite medication dosages and adjunct treatments such as ring clamps or vacuum pumps, or even ICI, we can restore his erectile function and improve his confidence.  -After his 6 m visit, he can have annual NGB f/u visits with renal US and BMP with Denice Collado PA-C    F/U with Dr. Turner in 6 months    I spent 25 minutes with the patient discussing the above mentioned findings and reviewing the assessment and plan, greater than 50% of which was spent on counseling and coordination of care. All questions were answered to the patients satisfaction.    Gagan Sneed DO  Fellow/Instructor  Department of Urology

## 2017-09-16 ENCOUNTER — PRE VISIT (OUTPATIENT)
Dept: UROLOGY | Facility: CLINIC | Age: 42
End: 2017-09-16

## 2017-09-16 NOTE — TELEPHONE ENCOUNTER
Patient with history of NGB coming in for catheterizing difficulties. Patient chart reviewed, no need for call, all records available and ready for appointment.

## 2017-09-18 ENCOUNTER — OFFICE VISIT (OUTPATIENT)
Dept: UROLOGY | Facility: CLINIC | Age: 42
End: 2017-09-18

## 2017-09-18 VITALS
DIASTOLIC BLOOD PRESSURE: 70 MMHG | HEART RATE: 70 BPM | SYSTOLIC BLOOD PRESSURE: 115 MMHG | WEIGHT: 217 LBS | BODY MASS INDEX: 28.76 KG/M2 | HEIGHT: 73 IN

## 2017-09-18 DIAGNOSIS — N31.9 NEUROGENIC BLADDER: Primary | ICD-10-CM

## 2017-09-18 RX ORDER — ACETAMINOPHEN 325 MG/1
650 TABLET ORAL
COMMUNITY
Start: 2015-08-13

## 2017-09-18 RX ORDER — DISULFIRAM 250 MG/1
125 TABLET ORAL
COMMUNITY
Start: 2015-07-29

## 2017-09-18 RX ORDER — ULTRASOUND COUPLING MEDIUM
1 GEL (GRAM) TOPICAL
COMMUNITY
Start: 2015-08-13

## 2017-09-18 ASSESSMENT — PAIN SCALES - GENERAL: PAINLEVEL: NO PAIN (0)

## 2017-09-18 NOTE — MR AVS SNAPSHOT
After Visit Summary   2017    Christopher Martin    MRN: [id number]           Patient Information     Date Of Birth          1975        Visit Information        Provider Department      2017 11:30 AM Alden Turner MD [facility] Urology and [facility] for Prostate and Urologic Cancers        Today's Diagnoses     Neurogenic bladder    -  1       Follow-ups after your visit        Follow-up notes from your care team     Return in about 6 months (around 3/18/2018).      Who to contact     Please call your clinic at [phone] to:    Ask questions about your health    Make or cancel appointments    Discuss your medicines    Learn about your test results    Speak to your doctor   If you have compliments or concerns about an experience at your clinic, or if you wish to file a complaint, please contact [facility] Patient Relations at [phone] or email us at [email or web address]         Additional Information About Your Visit        MyChart Information     SMARTt is an electronic gateway that provides easy, online access to your medical records. With Erly, you can request a clinic appointment, read your test results, renew a prescription or communicate with your care team.     To sign up for SMARTt visit the website at [email or web address]   You will be asked to enter the access code listed below, as well as some personal information. Please follow the directions to create your username and password.     Your access code is: [email or web address]  Expires: 2017  6:30 AM     Your access code will  in 90 days. If you need help or a new code, please contact your [facility] or call [phone] for assistance.        Care EveryWhere ID     This is your Care EveryWhere ID. This could be used by other organizations to access your [facility] medical records  [id number]        Your Vitals Were     Pulse Height BMI (Body Mass  "Index)             70 1.854 m (6' 1\") 28.63 kg/m2          Blood Pressure from Last 3 Encounters:   09/18/17 115/70   05/16/17 112/74   01/30/17 122/74    Weight from Last 3 Encounters:   09/18/17 98.4 kg (217 lb)   05/16/17 98.4 kg (217 lb)   01/30/17 99.8 kg (220 lb)              Today, you had the following     No orders found for display       Primary Care Provider Office Phone # Fax #    Lizzie Wang [phone]       [facility] [address] [facility] [address]        Equal Access to Services     EDDIE [facility] : Matthew Anthony, june branch, qalashanda kaalmada kisha, stacy sanchez . So [facility] [phone].    ATENCIÓN: Si habla español, tiene a carnes disposición servicios gratuitos de asistencia lingüística. Llame al [phone].    We comply with applicable federal civil rights laws and [place] laws. We do not discriminate on the basis of race, color, national origin, age, disability sex, sexual orientation or gender identity.            Thank you!     Thank you for choosing [facility] UROLOGY AND [facility] FOR PROSTATE AND UROLOGIC CANCERS  for your care. Our goal is always to provide you with excellent care. Hearing back from our patients is one way we can continue to improve our services. Please take a few minutes to complete the written survey that you may receive in the mail after your visit with us. Thank you!             Your Updated Medication List - Protect others around you: Learn how to safely use, store and throw away your medicines at www.disposemymeds.org.          This list is accurate as of: 9/18/17  1:07 PM.  Always use your most recent med list.                   Brand Name Dispense Instructions for use Diagnosis    acetaminophen 325 MG tablet    TYLENOL     Take 650 mg by mouth        clonazePAM 1 MG tablet    klonoPIN    60 tablet    Take 1 tablet (1 mg) by mouth 2 times daily as needed for anxiety    Spinal " cord injury, lumbar, without spinal bone injury, sequela (H)       cyclobenzaprine 5 MG tablet    FLEXERIL     Take 5 mg by mouth    Spinal cord injury, lumbar, without spinal bone injury, sequela (H), Other back pain       disulfiram 250 MG tablet    ANTABUSE     Take 125 mg by mouth        LYRICA PO      Take 75 mg by mouth 3 times daily        polyethylene glycol powder    MIRALAX    510 g    Take 5 g by mouth daily    Neurogenic bladder       sildenafil 100 MG tablet    VIAGRA    6 tablet    Take 0.5-1 tablets ( mg) by mouth daily as needed for erectile dysfunction    Erectile dysfunction due to diseases classified elsewhere       solifenacin 5 MG tablet    VESICARE    90 tablet    Take 1 tablet (5 mg) by mouth daily    Neurogenic bladder       surgical lubricant topical gel      Apply 1 mL topically        venlafaxine 150 MG 24 hr capsule    EFFEXOR-XR    90 capsule    Take 1 capsule (150 mg) by mouth daily    Anxiety

## 2017-09-18 NOTE — NURSING NOTE
"Chief Complaint   Patient presents with     RECHECK     difficulty catheterizing       Blood pressure 115/70, pulse 70, height 1.854 m (6' 1\"), weight 98.4 kg (217 lb). Body mass index is 28.63 kg/(m^2).    Patient Active Problem List   Diagnosis     Neurogenic bladder     Spinal cord injury, lumbar, without spinal bone injury (H)     Stable burst fracture of first lumbar vertebra (H)     Erectile dysfunction due to diseases classified elsewhere     Other ejaculatory dysfunction       Allergies   Allergen Reactions     Penicillin G Anaphylaxis     Penicillins Anaphylaxis       Current Outpatient Prescriptions   Medication Sig Dispense Refill     acetaminophen (TYLENOL) 325 MG tablet Take 650 mg by mouth       disulfiram (ANTABUSE) 250 MG tablet Take 125 mg by mouth       Lubricants (SURGICAL LUBRICANT) topical gel Apply 1 mL topically       Pregabalin (LYRICA PO) Take 75 mg by mouth 3 times daily       solifenacin (VESICARE) 5 MG tablet Take 1 tablet (5 mg) by mouth daily 90 tablet 3     polyethylene glycol (MIRALAX) powder Take 5 g by mouth daily 510 g 1     venlafaxine (EFFEXOR-XR) 150 MG 24 hr capsule Take 1 capsule (150 mg) by mouth daily 90 capsule 1     clonazePAM (KLONOPIN) 1 MG tablet Take 1 tablet (1 mg) by mouth 2 times daily as needed for anxiety 60 tablet 0     sildenafil (VIAGRA) 100 MG tablet Take 0.5-1 tablets ( mg) by mouth daily as needed for erectile dysfunction 6 tablet 12     cyclobenzaprine (FLEXERIL) 5 MG tablet Take 5 mg by mouth         Social History   Substance Use Topics     Smoking status: Former Smoker     Smokeless tobacco: Current User     Types: Chew     Alcohol use Ranjana Cook LPN  9/18/2017  11:52 AM    "

## 2017-09-18 NOTE — PROGRESS NOTES
Chief Complaint:   Neurogenic Bladder           History of Present Illness:     HPI - 9/18/2017  Doing well  No new issues  Has resistence with a straight cath - we tried a coude and that works better.   Likes vesicare - it's working well for him  Using viagra. But complains of low libido.        HPI 5/16/17:  He has been on Vesicare for NDO, which has been helping a lot. He catheterizes every 4 hours or so and thinks his volumes are 4-600 cc. When he's really full, he still leaks a bit, however, it's certainly better than before. He uses 3 pads / 24 hours, he does wear a pull up and sleeps very deeply and voids into it once/night.     His wife just had a baby boy 2 months ago. Viagra was working acceptably well, however, hasn't needed it recently. When we see each other next, we'll re-evaluate his erection when he becomes sexually active again.     HP 1/30/17: Christopher Martin is a 41 year old male with a history of neurogenic bladder secondary to SCI / L1 burst fracture after a fall in 2015. Patient is not wheelchair bound.      Previous Bladder Surgeries:  Previous Bladder Augmentation: none  Catheterizable stoma:none  Anti-incontinence procedures: none  Botox injections: None     Pertinent Medications:  Current Anticholinergics: none (waiting for Rx to go through)  Current Prophylactic antibiotics: None  Intravesical gentamycin:  None  Intravesical oxybutinin: None     Catheterization History:  The patient catheterizes per native urethra with a 14F straight catheter q4 hours. Catheterization is performed by  self. The patient uses a new catheter each time. He does not irrigate the bladder.      Incontinence History:  He does not leak between voids/caths. He does experience urgency of urination. He does experience stress urinary incontinence with the following activities: upon standing with a full bladder. At night, he has significant incontinence requiring a brief and a pad that gets quite wet during the  night. He wakes up twice a night (but sometimes less because he's a heavy sleeper).     Urinary Tract Infection History:  Treated with antibiotics for positive culture and non-specific symptoms: 0  times in the last year     Bowel Movement History:  The patient has a bowel movement q1 days. Bowel regimen involves daily digital stimulation. He does have fecal urgency, but is able to make it to the toilet - no leakage episodes.     Sexual History  The patient is sexually active. He does have spontaneous erections. He does occasionally use PDE5 inhibitors, which does result in adequate erections. With Viagra, his erections are better, but not as firm as he would like.     Is able to walk, but has poor plantar flexion and gluteal strength. He does have ability to clench his anal sphincter - but it is weak. He does not think he can hold in flatus.           Past Medical History:       Past Medical History    Past Medical History:   Diagnosis Date     Anxiety       Fracture of spinous process of thoracic vertebra, with delayed healing, subsequent encounter       AND LUMBAR      Hypertension       Neurogenic bladder       Panic attack       Spinal cord injury, lumbar, without spinal bone injury (H)       Stable burst fracture of first lumbar vertebra (H)        9- at work  fell off a roof      Weakness       le BILATERAL                 Past Surgical History:       Past Surgical History            Past Surgical History:   Procedure Laterality Date     AS SPINAL FUSION,ANT,EA ADNL LEVEL          t-11- l3      Knee scope                     Social History:           Social History   Substance Use Topics     Smoking status: Former Smoker     Smokeless tobacco: Current User     Alcohol use No              Family History:       Family History            Family History   Problem Relation Age of Onset     Family History Negative Mother                   Allergies:           Allergies   Allergen Reactions  "    Penicillin G Anaphylaxis     Penicillins Anaphylaxis              Medications:      Current Outpatient Prescriptions   Medication Sig     solifenacin (VESICARE) 5 MG tablet Take 1 tablet (5 mg) by mouth daily     polyethylene glycol (MIRALAX) powder Take 5 g by mouth daily     oxybutynin (DITROPAN) 5 MG tablet Take 1-2 tablets (5-10 mg) by mouth nightly as needed for bladder spasms     clomiPHENE (CLOMID) 50 MG tablet Take one half tablet (25 mg) Monday, Wednesday and Friday     venlafaxine (EFFEXOR-XR) 150 MG 24 hr capsule Take 1 capsule (150 mg) by mouth daily     clonazePAM (KLONOPIN) 1 MG tablet Take 1 tablet (1 mg) by mouth 2 times daily as needed for anxiety     sildenafil (VIAGRA) 100 MG tablet Take 0.5-1 tablets ( mg) by mouth daily as needed for erectile dysfunction     cyclobenzaprine (FLEXERIL) 5 MG tablet Take 5 mg by mouth     gabapentin (NEURONTIN) 400 MG capsule Take 1 capsule (400 mg) by mouth 3 times daily     disulfiram (ANTABUSE) 250 MG tablet Take 1 tablet (250 mg) by mouth daily      No current facility-administered medications for this visit.                Review of Systems:    ROS: 10 point ROS neg other than the symptoms noted above in the HPI and PMH.      /70  Pulse 70  Ht 1.854 m (6' 1\")  Wt 98.4 kg (217 lb)  BMI 28.63 kg/m2  abd soft  No CVAT            Data:                                                                                         Imagin17 Renal US:  No hydro stones masses     Labs:        Creatinine   Date Value Ref Range Status   2017 0.81 0.66 - 1.25 mg/dL Final              Assessment and Plan:   41 year old male with neurogenic bladder secondary to L1 burst fracture requiring spinal fusion.     -We will continue vesicare and viagra  - coude catheters  - RTC in 6 months          "

## 2017-09-18 NOTE — LETTER
9/18/2017       RE: Christopher Martin  [address]  [facility] [address]     Dear Colleague,    Thank you for referring your patient, Christopher Martin, to the [facility] UROLOGY AND INST FOR PROSTATE AND UROLOGIC CANCERS at [facility]. Please see a copy of my visit note below.          Chief Complaint:   Neurogenic Bladder           History of Present Illness:     HPI - 9/18/2017  Doing well  No new issues  Has resistence with a straight cath - we tried a coude and that works better.   Likes vesicare - it's working well for him  Using viagra. But complains of low libido.        HPI 5/16/17:  He has been on Vesicare for NDO, which has been helping a lot. He catheterizes every 4 hours or so and thinks his volumes are 4-600 cc. When he's really full, he still leaks a bit, however, it's certainly better than before. He uses 3 pads / 24 hours, he does wear a pull up and sleeps very deeply and voids into it once/night.     His wife just had a baby boy 2 months ago. Viagra was working acceptably well, however, hasn't needed it recently. When we see each other next, we'll re-evaluate his erection when he becomes sexually active again.     HP 1/30/17: Christopher Martin is a 41 year old male with a history of neurogenic bladder secondary to SCI / L1 burst fracture after a fall in 2015. Patient is not wheelchair bound.      Previous Bladder Surgeries:  Previous Bladder Augmentation: none  Catheterizable stoma:none  Anti-incontinence procedures: none  Botox injections: None     Pertinent Medications:  Current Anticholinergics: none (waiting for Rx to go through)  Current Prophylactic antibiotics: None  Intravesical gentamycin:  None  Intravesical oxybutinin: None     Catheterization History:  The patient catheterizes per native urethra with a 14F straight catheter q4 hours. Catheterization is performed by  self. The patient uses a new catheter each time. He does not irrigate the bladder.       Incontinence History:  He does not leak between voids/caths. He does experience urgency of urination. He does experience stress urinary incontinence with the following activities: upon standing with a full bladder. At night, he has significant incontinence requiring a brief and a pad that gets quite wet during the night. He wakes up twice a night (but sometimes less because he's a heavy sleeper).     Urinary Tract Infection History:  Treated with antibiotics for positive culture and non-specific symptoms: 0  times in the last year     Bowel Movement History:  The patient has a bowel movement q1 days. Bowel regimen involves daily digital stimulation. He does have fecal urgency, but is able to make it to the toilet - no leakage episodes.     Sexual History  The patient is sexually active. He does have spontaneous erections. He does occasionally use PDE5 inhibitors, which does result in adequate erections. With Viagra, his erections are better, but not as firm as he would like.     Is able to walk, but has poor plantar flexion and gluteal strength. He does have ability to clench his anal sphincter - but it is weak. He does not think he can hold in flatus.           Past Medical History:       Past Medical History         Past Medical History:   Diagnosis Date     Anxiety       Fracture of spinous process of thoracic vertebra, with delayed healing, subsequent encounter       AND LUMBAR      Hypertension       Neurogenic bladder       Panic attack       Spinal cord injury, lumbar, without spinal bone injury (H)       Stable burst fracture of first lumbar vertebra (H)        9- at work  fell off a roof      Weakness       le BILATERAL                 Past Surgical History:       Past Surgical History            Past Surgical History:   Procedure Laterality Date     AS SPINAL FUSION,ANT,EA ADNL LEVEL          t-11- l3      Knee scope                     Social History:           Social History  "  Substance Use Topics     Smoking status: Former Smoker     Smokeless tobacco: Current User     Alcohol use No              Family History:       Family History            Family History   Problem Relation Age of Onset     Family History Negative Mother                   Allergies:           Allergies   Allergen Reactions     Penicillin G Anaphylaxis     Penicillins Anaphylaxis              Medications:            Current Outpatient Prescriptions   Medication Sig     solifenacin (VESICARE) 5 MG tablet Take 1 tablet (5 mg) by mouth daily     polyethylene glycol (MIRALAX) powder Take 5 g by mouth daily     oxybutynin (DITROPAN) 5 MG tablet Take 1-2 tablets (5-10 mg) by mouth nightly as needed for bladder spasms     clomiPHENE (CLOMID) 50 MG tablet Take one half tablet (25 mg) Monday, Wednesday and Friday     venlafaxine (EFFEXOR-XR) 150 MG 24 hr capsule Take 1 capsule (150 mg) by mouth daily     clonazePAM (KLONOPIN) 1 MG tablet Take 1 tablet (1 mg) by mouth 2 times daily as needed for anxiety     sildenafil (VIAGRA) 100 MG tablet Take 0.5-1 tablets ( mg) by mouth daily as needed for erectile dysfunction     cyclobenzaprine (FLEXERIL) 5 MG tablet Take 5 mg by mouth     gabapentin (NEURONTIN) 400 MG capsule Take 1 capsule (400 mg) by mouth 3 times daily     disulfiram (ANTABUSE) 250 MG tablet Take 1 tablet (250 mg) by mouth daily      No current facility-administered medications for this visit.                Review of Systems:    ROS: 10 point ROS neg other than the symptoms noted above in the HPI and PMH.      /70  Pulse 70  Ht 1.854 m (6' 1\")  Wt 98.4 kg (217 lb)  BMI 28.63 kg/m2  abd soft  No CVAT            Data:                                                                                         Imagin17 Renal US:  No hydro stones masses     Labs:        Creatinine   Date Value Ref Range Status   2017 0.81 0.66 - 1.25 mg/dL Final              Assessment and Plan:   41 year old " male with neurogenic bladder secondary to L1 burst fracture requiring spinal fusion.     -We will continue vesicare and viagra  - coude catheters  - RTC in 6 months            Again, thank you for allowing me to participate in the care of your patient.      Sincerely,    Alden Turner MD

## 2017-11-07 ENCOUNTER — TELEPHONE (OUTPATIENT)
Dept: UROLOGY | Facility: CLINIC | Age: 42
End: 2017-11-07

## 2017-11-07 NOTE — TELEPHONE ENCOUNTER
[facility] Prior Authorization Team   Phone: [phone]  Fax: [phone]      Prior Authorization Not Needed per Insurance    Medication: vesicare 5 mg- No Prior Auth Needed  Insurance Company:  Workmans Comp  Expected CoPay:    n/a  Pharmacy Filling the Rx: [id number] [address]  Pharmacy Notified: Yes- Per pharmacist this medication is to go through [employer] comp, which the pharmacy takes care on their end and are waiting approval; he stated that the system automatically sends the PA request, but no prior auth is needed through is insurance.  Patient Notified: No

## 2018-02-14 DIAGNOSIS — N31.9 NEUROGENIC BLADDER: ICD-10-CM

## 2018-02-14 RX ORDER — SOLIFENACIN SUCCINATE 5 MG/1
5 TABLET, FILM COATED ORAL DAILY
Qty: 90 TABLET | Refills: 3 | Status: SHIPPED | OUTPATIENT
Start: 2018-02-14

## 2018-08-14 DIAGNOSIS — N52.1 ERECTILE DYSFUNCTION DUE TO DISEASES CLASSIFIED ELSEWHERE: ICD-10-CM

## 2018-08-14 RX ORDER — SILDENAFIL 100 MG/1
50-100 TABLET, FILM COATED ORAL DAILY PRN
Qty: 6 TABLET | Refills: 2 | Status: SHIPPED | OUTPATIENT
Start: 2018-08-14

## 2018-08-15 ENCOUNTER — TELEPHONE (OUTPATIENT)
Dept: UROLOGY | Facility: CLINIC | Age: 43
End: 2018-08-15

## 2018-08-15 NOTE — TELEPHONE ENCOUNTER
----- Message from Travis Simms MD sent at 8/15/2018  3:50 PM CDT -----  Yes, I think is related to work comp.  Federico HARRIS    ----- Message -----     From: Zara Butcher LPN     Sent: 8/15/2018   8:09 AM       To: Travis Simms MD    You ordered viagra for this patient yesterday and it was put on his workmans, comp is that correct this is related to his injury?? Zara Butcher LPN Staff Nurse'

## 2018-08-20 ENCOUNTER — PRE VISIT (OUTPATIENT)
Dept: UROLOGY | Facility: CLINIC | Age: 43
End: 2018-08-20

## 2018-08-20 NOTE — TELEPHONE ENCOUNTER
Patient with history of NGB coming in for follow up per patient. Patient chart reviewed, no need for call, all records available and ready for appointment.

## 2018-12-31 ENCOUNTER — TELEPHONE (OUTPATIENT)
Dept: UROLOGY | Facility: CLINIC | Age: 43
End: 2018-12-31

## 2019-01-02 NOTE — TELEPHONE ENCOUNTER
Prior Authorization Not Needed per Insurance    Medication: solifenacin (VESICARE) 5 MG tablet-PA NOT NEEDED  Insurance Company: Other (see comments)Comment:  Workmans Comp  Expected CoPay:      Pharmacy Filling the Rx: [id number] - [place], [place] - [address]  Pharmacy Notified: Yes  Patient Notified: No    Called pharmacy and pharmacy stated that PA is Not Needed and medication is covered. Pharmacy states that medication is billed to workmans comp. Pharmacy will notify patient when medication is ready.

## (undated) RX ORDER — CIPROFLOXACIN 500 MG/1
TABLET, FILM COATED ORAL
Status: DISPENSED
Start: 2017-01-18